# Patient Record
Sex: FEMALE | Race: BLACK OR AFRICAN AMERICAN | NOT HISPANIC OR LATINO | URBAN - METROPOLITAN AREA
[De-identification: names, ages, dates, MRNs, and addresses within clinical notes are randomized per-mention and may not be internally consistent; named-entity substitution may affect disease eponyms.]

---

## 2021-03-24 ENCOUNTER — EMERGENCY (EMERGENCY)
Facility: HOSPITAL | Age: 66
LOS: 0 days | Discharge: HOME | End: 2021-03-24
Attending: STUDENT IN AN ORGANIZED HEALTH CARE EDUCATION/TRAINING PROGRAM
Payer: MEDICARE

## 2021-03-24 VITALS
HEART RATE: 98 BPM | WEIGHT: 240.08 LBS | DIASTOLIC BLOOD PRESSURE: 85 MMHG | SYSTOLIC BLOOD PRESSURE: 171 MMHG | OXYGEN SATURATION: 98 % | RESPIRATION RATE: 17 BRPM

## 2021-03-24 VITALS
RESPIRATION RATE: 18 BRPM | SYSTOLIC BLOOD PRESSURE: 141 MMHG | HEART RATE: 71 BPM | OXYGEN SATURATION: 100 % | TEMPERATURE: 98 F | DIASTOLIC BLOOD PRESSURE: 75 MMHG

## 2021-03-24 DIAGNOSIS — K62.5 HEMORRHAGE OF ANUS AND RECTUM: ICD-10-CM

## 2021-03-24 DIAGNOSIS — K64.8 OTHER HEMORRHOIDS: ICD-10-CM

## 2021-03-24 DIAGNOSIS — Z87.891 PERSONAL HISTORY OF NICOTINE DEPENDENCE: ICD-10-CM

## 2021-03-24 LAB
ALBUMIN SERPL ELPH-MCNC: 4.4 G/DL — SIGNIFICANT CHANGE UP (ref 3.5–5.2)
ALP SERPL-CCNC: 47 U/L — SIGNIFICANT CHANGE UP (ref 30–115)
ALT FLD-CCNC: 18 U/L — SIGNIFICANT CHANGE UP (ref 0–41)
ANION GAP SERPL CALC-SCNC: 11 MMOL/L — SIGNIFICANT CHANGE UP (ref 7–14)
APTT BLD: 35.3 SEC — SIGNIFICANT CHANGE UP (ref 27–39.2)
AST SERPL-CCNC: 21 U/L — SIGNIFICANT CHANGE UP (ref 0–41)
BASOPHILS # BLD AUTO: 0.02 K/UL — SIGNIFICANT CHANGE UP (ref 0–0.2)
BASOPHILS NFR BLD AUTO: 0.4 % — SIGNIFICANT CHANGE UP (ref 0–1)
BILIRUB SERPL-MCNC: 0.5 MG/DL — SIGNIFICANT CHANGE UP (ref 0.2–1.2)
BLD GP AB SCN SERPL QL: SIGNIFICANT CHANGE UP
BUN SERPL-MCNC: 9 MG/DL — LOW (ref 10–20)
CALCIUM SERPL-MCNC: 9.6 MG/DL — SIGNIFICANT CHANGE UP (ref 8.5–10.1)
CHLORIDE SERPL-SCNC: 105 MMOL/L — SIGNIFICANT CHANGE UP (ref 98–110)
CO2 SERPL-SCNC: 25 MMOL/L — SIGNIFICANT CHANGE UP (ref 17–32)
CREAT SERPL-MCNC: 0.7 MG/DL — SIGNIFICANT CHANGE UP (ref 0.7–1.5)
EOSINOPHIL # BLD AUTO: 0.01 K/UL — SIGNIFICANT CHANGE UP (ref 0–0.7)
EOSINOPHIL NFR BLD AUTO: 0.2 % — SIGNIFICANT CHANGE UP (ref 0–8)
GLUCOSE SERPL-MCNC: 146 MG/DL — HIGH (ref 70–99)
HCT VFR BLD CALC: 41.6 % — SIGNIFICANT CHANGE UP (ref 37–47)
HGB BLD-MCNC: 13.6 G/DL — SIGNIFICANT CHANGE UP (ref 12–16)
IMM GRANULOCYTES NFR BLD AUTO: 0.2 % — SIGNIFICANT CHANGE UP (ref 0.1–0.3)
INR BLD: 1.12 RATIO — SIGNIFICANT CHANGE UP (ref 0.65–1.3)
LYMPHOCYTES # BLD AUTO: 1.13 K/UL — LOW (ref 1.2–3.4)
LYMPHOCYTES # BLD AUTO: 22.9 % — SIGNIFICANT CHANGE UP (ref 20.5–51.1)
MCHC RBC-ENTMCNC: 29.6 PG — SIGNIFICANT CHANGE UP (ref 27–31)
MCHC RBC-ENTMCNC: 32.7 G/DL — SIGNIFICANT CHANGE UP (ref 32–37)
MCV RBC AUTO: 90.4 FL — SIGNIFICANT CHANGE UP (ref 81–99)
MONOCYTES # BLD AUTO: 0.37 K/UL — SIGNIFICANT CHANGE UP (ref 0.1–0.6)
MONOCYTES NFR BLD AUTO: 7.5 % — SIGNIFICANT CHANGE UP (ref 1.7–9.3)
NEUTROPHILS # BLD AUTO: 3.4 K/UL — SIGNIFICANT CHANGE UP (ref 1.4–6.5)
NEUTROPHILS NFR BLD AUTO: 68.8 % — SIGNIFICANT CHANGE UP (ref 42.2–75.2)
NRBC # BLD: 0 /100 WBCS — SIGNIFICANT CHANGE UP (ref 0–0)
PLATELET # BLD AUTO: 311 K/UL — SIGNIFICANT CHANGE UP (ref 130–400)
POTASSIUM SERPL-MCNC: 5 MMOL/L — SIGNIFICANT CHANGE UP (ref 3.5–5)
POTASSIUM SERPL-SCNC: 5 MMOL/L — SIGNIFICANT CHANGE UP (ref 3.5–5)
PROT SERPL-MCNC: 7.4 G/DL — SIGNIFICANT CHANGE UP (ref 6–8)
PROTHROM AB SERPL-ACNC: 12.9 SEC — HIGH (ref 9.95–12.87)
RBC # BLD: 4.6 M/UL — SIGNIFICANT CHANGE UP (ref 4.2–5.4)
RBC # FLD: 12.8 % — SIGNIFICANT CHANGE UP (ref 11.5–14.5)
SODIUM SERPL-SCNC: 141 MMOL/L — SIGNIFICANT CHANGE UP (ref 135–146)
WBC # BLD: 4.94 K/UL — SIGNIFICANT CHANGE UP (ref 4.8–10.8)
WBC # FLD AUTO: 4.94 K/UL — SIGNIFICANT CHANGE UP (ref 4.8–10.8)

## 2021-03-24 PROCEDURE — 99284 EMERGENCY DEPT VISIT MOD MDM: CPT

## 2021-03-24 PROCEDURE — 99283 EMERGENCY DEPT VISIT LOW MDM: CPT

## 2021-03-24 NOTE — CONSULT NOTE ADULT - SUBJECTIVE AND OBJECTIVE BOX
TRESSA DOHERTYTINE [id number]  65y Female      HPI:    64yo female with PMHx external hemorrhoids presents c/o progressively worsening hemorrhoidal bleeding since last night and at its worst prior to arrival with passage of bright red blood and clots. Pt notes mild pain and oozing of blood that began last night and accelerated this AM. Pt denies abdominal pain, lightheadedness or dizziness. Has had h/o hemorrhoids but has not needed any prior intervention and has not sought medical care. Pt notes intermittent h/o constipation and often sits for prolonged minutes on toilet.    PAST MEDICAL & SURGICAL HISTORY:        MEDICATIONS  (STANDING):    MEDICATIONS  (PRN):      Allergies    No Known Allergies    Intolerances        REVIEW OF SYSTEMS    [c] A ten-point review of systems was otherwise negative except as noted.  [ ] Due to altered mental status/intubation, subjective information were not able to be obtained from the patient. History was obtained, to the extent possible, from review of the chart and collateral sources of information.      Vital Signs Last 24 Hrs  T(C): --  T(F): --  HR: 98 (24 Mar 2021 08:10) (98 - 98)  BP: 171/85 (24 Mar 2021 08:10) (171/85 - 171/85)  BP(mean): --  RR: 17 (24 Mar 2021 08:10) (17 - 17)  SpO2: 98% (24 Mar 2021 08:10) (98% - 98%)    PHYSICAL EXAM:  GENERAL: NAD, well-appearing  CHEST/LUNG: Clear to auscultation bilaterally  HEART: Regular rate and rhythm  ABDOMEN: Soft, Nontender, Nondistended;   EXTREMITIES:  No clubbing, cyanosis, or edema  RECTAL: Thrombosed external hemorrhoid at posterior anal canal, ttp, with active oozing    LABS:  Labs:  CAPILLARY BLOOD GLUCOSE                              13.6   4.94  )-----------( 311      ( 24 Mar 2021 09:21 )             41.6       Auto Neutrophil %: 68.8 % (03-24-21 @ 09:21)  Auto Immature Granulocyte %: 0.2 % (03-24-21 @ 09:21)    03-24    141  |  105  |  9<L>  ----------------------------<  146<H>  5.0   |  25  |  0.7      Calcium, Total Serum: 9.6 mg/dL (03-24-21 @ 09:21)      LFTs:             7.4  | 0.5  | 21       ------------------[47      ( 24 Mar 2021 09:21 )  4.4  | x    | 18          Lipase:x      Amylase:x             Coags:     12.90  ----< 1.12    ( 24 Mar 2021 09:21 )     35.3          RADIOLOGY & ADDITIONAL STUDIES:   TRESSA DOHERTYTINE [id number]  65y Female      HPI:    66yo female with PMHx external hemorrhoids presents c/o progressively worsening hemorrhoidal bleeding since last night and at its worst prior to arrival with passage of bright red blood and clots. Pt notes mild pain and oozing of blood that began last night and accelerated this AM. Pt denies abdominal pain, lightheadedness or dizziness. Has had h/o hemorrhoids but has not needed any prior intervention and has not sought medical care. Pt notes intermittent h/o constipation and often sits for prolonged minutes on toilet.    PAST MEDICAL & SURGICAL HISTORY:        MEDICATIONS  (STANDING):    MEDICATIONS  (PRN):      Allergies    No Known Allergies    Intolerances        REVIEW OF SYSTEMS    [x] A ten-point review of systems was otherwise negative except as noted.  [ ] Due to altered mental status/intubation, subjective information were not able to be obtained from the patient. History was obtained, to the extent possible, from review of the chart and collateral sources of information.      Vital Signs Last 24 Hrs  T(C): --  T(F): --  HR: 98 (24 Mar 2021 08:10) (98 - 98)  BP: 171/85 (24 Mar 2021 08:10) (171/85 - 171/85)  BP(mean): --  RR: 17 (24 Mar 2021 08:10) (17 - 17)  SpO2: 98% (24 Mar 2021 08:10) (98% - 98%)    PHYSICAL EXAM:  GENERAL: NAD, well-appearing  CHEST/LUNG: Clear to auscultation bilaterally  HEART: Regular rate and rhythm  ABDOMEN: Soft, Nontender, Nondistended;   EXTREMITIES:  No clubbing, cyanosis, or edema  RECTAL: Thrombosed external hemorrhoid at posterior anal canal, ttp, with active oozing    LABS:  Labs:  CAPILLARY BLOOD GLUCOSE                              13.6   4.94  )-----------( 311      ( 24 Mar 2021 09:21 )             41.6       Auto Neutrophil %: 68.8 % (03-24-21 @ 09:21)  Auto Immature Granulocyte %: 0.2 % (03-24-21 @ 09:21)    03-24    141  |  105  |  9<L>  ----------------------------<  146<H>  5.0   |  25  |  0.7      Calcium, Total Serum: 9.6 mg/dL (03-24-21 @ 09:21)      LFTs:             7.4  | 0.5  | 21       ------------------[47      ( 24 Mar 2021 09:21 )  4.4  | x    | 18          Lipase:x      Amylase:x             Coags:     12.90  ----< 1.12    ( 24 Mar 2021 09:21 )     35.3          RADIOLOGY & ADDITIONAL STUDIES:

## 2021-03-24 NOTE — ED PROVIDER NOTE - CLINICAL SUMMARY MEDICAL DECISION MAKING FREE TEXT BOX
65 yr old f that presents with an active bleeding hemorrhoid. pt evaluated by surgery, labs obtained. bleeding stopped and pt discharged with strict return precautions and surgery follow up.

## 2021-03-24 NOTE — ED PROVIDER NOTE - PROGRESS NOTE DETAILS
Upon initial evaluation, profuse bright red blood bleeding from visible external enlarged inflamed hemorrhoid. Clots presents. Bleeding controlled with pressure and TXA soaked gauze. Discussed with surgery will consult.

## 2021-03-24 NOTE — CONSULT NOTE ADULT - ATTENDING COMMENTS
Patient is a 65F with PMH of bleeding hemorrhoids and constipation presents to ER with bleeding hemorrhoids.     Vitals and labs reviewed  Hgb 13    Plan:  - Patient offered topical treatment vs surgical resection.  Patient would like topical treatment and surgery if it fails  - Bleeding was controlled with gauze and surgicel  - Recommended high fiber diet, fluid intake stool softeners  - Patient to follow up in my office on 3/29

## 2021-03-24 NOTE — CONSULT NOTE ADULT - ASSESSMENT
A/P:  EVELYN DOHERTY is a 65y year old Female HD/POD ___ from . ***Surgery consulted for__***. She is currently ____.      Plan:  Will attempt hemostasis with rolled Surgicel and gauze  Reevaluate for adequate hemostasis A/P:  EVELYN DOHERTY is a 65y year old Female presented to the ed with bleeding per rectum. Upon obtaining history as well as physical exam, appears to be bleeding from a prior thrombosed hemorrhoid. In ed, txa soaked gauze was placed. Gauze covered with surgicel was packed in rectum with adequate hemostasis.  hemoglobin 13    Plan:  Rolled Surgicel and gauze placed in anus with adequate hemostasis  Gauze to be removed with first bowel movement  Given return precautions  Patient to follow up with Dr. Jones in office   avoid abdominal straining  stool softeners   A/P:  EVELYN DOHERTY is a 65y year old Female presented to the ed with bleeding per rectum. Upon obtaining history as well as physical exam, appears to be bleeding from a prior thrombosed hemorrhoid. In ed, txa soaked gauze was placed. Gauze covered with surgicel was packed in rectum with adequate hemostasis.  hemoglobin 13    Plan:  Rolled Surgicel and gauze placed in anus with adequate hemostasis  Gauze to be removed with first bowel movement  Given return precautions  Patient to follow up with Dr. Jones in office   avoid abdominal straining  stool softeners  Patient in agreement with plan

## 2021-03-24 NOTE — ED PROVIDER NOTE - PATIENT PORTAL LINK FT
You can access the FollowMyHealth Patient Portal offered by [facility] by registering at the following website: [email or web address]. By joining [employer]’s FollowMyHealth portal, you will also be able to view your health information using other applications (apps) compatible with our system.

## 2021-03-24 NOTE — ED PROVIDER NOTE - NS ED ROS FT
CONST: No fever, chills or bodyaches  CARD: No chest pain, palpitations  RESP: No SOB, cough  GI: No abdominal pain, N/V/D, see HPI  MS: No joint pain, back pain or extremity pain/injury  SKIN: No rashes  NEURO: No headache, dizziness, paresthesias or LOC

## 2021-03-24 NOTE — ED PROVIDER NOTE - PHYSICAL EXAMINATION
CONST: Well appearing in NAD  EYES: PERRL, EOMI, Sclera and conjunctiva clear.   CARD: Normal S1 S2; Normal rate and rhythm  RESP: Equal BS B/L, No wheezes, rhonchi or rales. No distress  GI: Soft, non-tender, non-distended.  RECTAL: Moderate bright red blood from large thrombosed hemorrhoid at 3 o'clock  MS: Normal ROM in all extremities. No midline spinal tenderness.  SKIN: Warm, dry, no acute rashes. Good turgor

## 2021-03-24 NOTE — ED ADULT NURSE NOTE - OBJECTIVE STATEMENT
patient reports bleeding of hemorrhoid onset last night with clots no ac - patient bleeding controlled in ed reports mild pain denies dizziness

## 2021-03-24 NOTE — ED PROVIDER NOTE - CARE PROVIDER_API CALL
Hugo Jones)  ColonRectal Surgery; Surgery  [address], [address] [place] [address]  Phone: [phone]  Fax: [phone]  Follow Up Time:

## 2021-03-24 NOTE — ED PROVIDER NOTE - OBJECTIVE STATEMENT
66yo female with PMHx external hemorrhoids presents c/o progressively worsening hemorrhoidal bleeding since last night and at its worst prior to arrival with passage of bright red blood and clots. Pt notes mild pain and oozing of blood that began last night and accelerated this AM. Pt denies abdominal pain, lightheadedness or dizziness. Has had h/o hemorrhoids but has not needed any prior intervention and has not sought medical care. Pt notes intermittent h/o constipation and often sits for prolonged minutes on toilet.

## 2021-03-24 NOTE — ED PROVIDER NOTE - ATTENDING CONTRIBUTION TO CARE
65 yr old f w/ a pmh significant for external hemorrhoids, who presents with a bleeding hemorrhoid. Pt states that she 65 yr old f w/ a pmh significant for external hemorrhoids, who presents with a bleeding hemorrhoid. Pt states that yesterday she was in her car when she felt a gush of blood. Pt reports that she has been having multiple blood clots and has been having to use pads due to the bleeding. Pt denies any constipation, nausea, vomiting, fevers, chills or any other complaints.     VITAL SIGNS: I have reviewed nursing notes and confirm.  CONSTITUTIONAL: non-toxic, well appearing  SKIN: no rash, no petechiae.  EYES: PERRL, EOMI, pink conjunctiva, anicteric  ENT: tongue midline, no exudates, MMM  NECK: Supple; no meningismus, no JVD  CARD: RRR, no murmurs, equal radial pulses bilaterally 2+  RESP: CTAB, no respiratory distress  ABD: Soft, non-tender, non-distended, no peritoneal signs, no HSM, no CVA tenderness  : thrombosed external hemorrhoids, actively bleeding   EXT: Normal ROM x4. No edema. No calves tenderness  NEURO: Alert, oriented. CN2-12 intact, equal strength bilaterally, nl gait.  PSYCH: Cooperative, appropriate.    a/p  65 yr old f that presents with a thrombosed external hemorrhoids.   bleeding stopped after actively placing pressure on area and txa. will obtain labs and also surgery consult.

## 2021-03-24 NOTE — ED PROVIDER NOTE - NSFOLLOWUPINSTRUCTIONS_ED_ALL_ED_FT
HEMORRHOIDS - Discharge Care           Hemorrhoids    WHAT YOU NEED TO KNOW:    Hemorrhoids are swollen blood vessels inside your rectum (internal hemorrhoids) or on your anus (external hemorrhoids). Sometimes a hemorrhoid may prolapse. This means it extends out of your anus.    DISCHARGE INSTRUCTIONS:    Seek care immediately if:   •You have severe pain in your rectum or around your anus.      •You have severe pain in your abdomen and you are vomiting.       •You have bleeding from your anus that soaks through your underwear.       Contact your healthcare provider if:   •You have frequent and painful bowel movements.      •Your hemorrhoid looks or feels more swollen than usual.       •You do not have a bowel movement for 2 days or more.       •You see or feel tissue coming through your anus.       •You have questions or concerns about your condition or care.      Medicines: You may need any of the following:   •Medicine may be given to decrease pain, swelling, and itching. The medicine may come as a pad, cream, or ointment.       •Stool softeners help treat or prevent constipation.       •NSAIDs, such as ibuprofen, help decrease swelling, pain, and fever. NSAIDs can cause stomach bleeding or kidney problems in certain people. If you take blood thinner medicine, always ask your healthcare provider if NSAIDs are safe for you. Always read the medicine label and follow directions.      •Take your medicine as directed. Contact your healthcare provider if you think your medicine is not helping or if you have side effects. Tell him or her if you are allergic to any medicine. Keep a list of the medicines, vitamins, and herbs you take. Include the amounts, and when and why you take them. Bring the list or the pill bottles to follow-up visits. Carry your medicine list with you in case of an emergency.      Manage your symptoms:   •Apply ice on your anus for 15 to 20 minutes every hour or as directed. Use an ice pack, or put crushed ice in a plastic bag. Cover it with a towel before you apply it to your anus. Ice helps prevent tissue damage and decreases swelling and pain.      •Take a sitz bath. Fill a bathtub with 4 to 6 inches of warm water. You may also use a sitz bath pan that fits inside a toilet bowl. Sit in the sitz bath for 15 minutes. Do this 3 times a day, and after each bowel movement. The warm water can help decrease pain and swelling.       •Keep your anal area clean. Gently wash the area with warm water daily. Soap may irritate the area. After a bowel movement, wipe with moist towelettes or wet toilet paper. Dry toilet paper can irritate the area.       Prevent hemorrhoids:   •Do not strain to have a bowel movement. Do not sit on the toilet too long. These actions can increase pressure on the tissues in your rectum and anus.       •Drink plenty of liquids. Liquids can help prevent constipation. Ask how much liquid to drink each day and which liquids are best for you.       •Eat a variety of high-fiber foods. Examples include fruits, vegetables, and whole grains. Ask your healthcare provider how much fiber you need each day. You may need to take a fiber supplement.              •Exercise as directed. Exercise, such as walking, may make it easier to have a bowel movement. Ask your healthcare provider to help you create an exercise plan.       •Do not have anal sex. Anal sex can weaken the skin around your rectum and anus.       •Avoid heavy lifting. This can cause straining and increase your risk for another hemorrhoid.       Follow up with your healthcare provider as directed: Write down your questions so you remember to ask them during your visits.

## 2021-03-26 PROBLEM — Z00.00 ENCOUNTER FOR PREVENTIVE HEALTH EXAMINATION: Status: ACTIVE | Noted: 2021-03-26

## 2021-04-12 ENCOUNTER — APPOINTMENT (OUTPATIENT)
Dept: SURGERY | Facility: CLINIC | Age: 66
End: 2021-04-12